# Patient Record
Sex: FEMALE | NOT HISPANIC OR LATINO | Employment: STUDENT
[De-identification: names, ages, dates, MRNs, and addresses within clinical notes are randomized per-mention and may not be internally consistent; named-entity substitution may affect disease eponyms.]

---
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
## 2023-02-02 RX ORDER — ETONOGESTREL/ETHINYL ESTRADIOL .12-.015MG
RING, VAGINAL VAGINAL
Qty: 3 EACH | Refills: 3 | OUTPATIENT
Start: 2023-02-02

## 2023-05-02 ENCOUNTER — NURSE TRIAGE (OUTPATIENT)
Dept: TELEHEALTH | Age: 18
End: 2023-05-02

## 2023-05-02 ENCOUNTER — TELEPHONE (OUTPATIENT)
Dept: TELEHEALTH | Age: 18
End: 2023-05-02

## 2023-05-05 ENCOUNTER — IMAGING SERVICES (OUTPATIENT)
Dept: GENERAL RADIOLOGY | Age: 18
End: 2023-05-05
Attending: FAMILY MEDICINE

## 2023-05-05 ENCOUNTER — OFFICE VISIT (OUTPATIENT)
Dept: FAMILY MEDICINE | Age: 18
End: 2023-05-05

## 2023-05-05 VITALS
OXYGEN SATURATION: 100 % | HEART RATE: 76 BPM | BODY MASS INDEX: 20.89 KG/M2 | WEIGHT: 130 LBS | HEIGHT: 66 IN | SYSTOLIC BLOOD PRESSURE: 99 MMHG | DIASTOLIC BLOOD PRESSURE: 66 MMHG | TEMPERATURE: 97.6 F

## 2023-05-05 DIAGNOSIS — R05.3 PERSISTENT COUGH: ICD-10-CM

## 2023-05-05 DIAGNOSIS — R05.3 PERSISTENT COUGH: Primary | ICD-10-CM

## 2023-05-05 DIAGNOSIS — J20.9 ACUTE BRONCHITIS, UNSPECIFIED ORGANISM: ICD-10-CM

## 2023-05-05 PROCEDURE — 99214 OFFICE O/P EST MOD 30 MIN: CPT | Performed by: FAMILY MEDICINE

## 2023-05-05 PROCEDURE — 71046 X-RAY EXAM CHEST 2 VIEWS: CPT | Performed by: RADIOLOGY

## 2023-05-05 RX ORDER — PREDNISONE 20 MG/1
40 TABLET ORAL DAILY
Qty: 10 TABLET | Refills: 0 | Status: SHIPPED | OUTPATIENT
Start: 2023-05-05 | End: 2023-07-11 | Stop reason: ALTCHOICE

## 2023-05-05 RX ORDER — ALBUTEROL SULFATE 90 UG/1
1-2 AEROSOL, METERED RESPIRATORY (INHALATION)
COMMUNITY
Start: 2023-04-20 | End: 2023-07-11 | Stop reason: ALTCHOICE

## 2023-05-05 RX ORDER — AZITHROMYCIN 250 MG/1
TABLET, FILM COATED ORAL
Qty: 6 TABLET | Refills: 0 | Status: SHIPPED | OUTPATIENT
Start: 2023-05-05 | End: 2023-07-11 | Stop reason: ALTCHOICE

## 2023-05-05 ASSESSMENT — ENCOUNTER SYMPTOMS
FATIGUE: 0
COUGH: 1
SORE THROAT: 0
DIARRHEA: 0
ABDOMINAL PAIN: 0
CONSTIPATION: 0
SHORTNESS OF BREATH: 0
TROUBLE SWALLOWING: 0

## 2023-07-06 ENCOUNTER — NURSE TRIAGE (OUTPATIENT)
Dept: FAMILY MEDICINE | Age: 18
End: 2023-07-06

## 2023-07-11 ENCOUNTER — OFFICE VISIT (OUTPATIENT)
Dept: FAMILY MEDICINE | Age: 18
End: 2023-07-11

## 2023-07-11 ENCOUNTER — TELEPHONE (OUTPATIENT)
Dept: SURGERY | Age: 18
End: 2023-07-11

## 2023-07-11 VITALS
HEART RATE: 77 BPM | DIASTOLIC BLOOD PRESSURE: 74 MMHG | SYSTOLIC BLOOD PRESSURE: 115 MMHG | WEIGHT: 130.14 LBS | BODY MASS INDEX: 21.68 KG/M2 | OXYGEN SATURATION: 99 % | TEMPERATURE: 98.4 F | HEIGHT: 65 IN

## 2023-07-11 DIAGNOSIS — F41.1 GENERALIZED ANXIETY DISORDER: Primary | ICD-10-CM

## 2023-07-11 DIAGNOSIS — N60.82 CYST OF SKIN OF LEFT BREAST: ICD-10-CM

## 2023-07-11 PROBLEM — J20.9 ACUTE BRONCHITIS: Status: RESOLVED | Noted: 2023-05-05 | Resolved: 2023-07-11

## 2023-07-11 PROBLEM — R05.3 PERSISTENT COUGH: Status: RESOLVED | Noted: 2023-05-05 | Resolved: 2023-07-11

## 2023-07-11 PROCEDURE — 99214 OFFICE O/P EST MOD 30 MIN: CPT | Performed by: FAMILY MEDICINE

## 2023-07-11 RX ORDER — DESVENLAFAXINE 25 MG/1
25 TABLET, EXTENDED RELEASE ORAL DAILY
Qty: 30 TABLET | Refills: 3 | Status: SHIPPED | OUTPATIENT
Start: 2023-07-11

## 2023-07-11 ASSESSMENT — ENCOUNTER SYMPTOMS
FATIGUE: 0
SHORTNESS OF BREATH: 0
NERVOUS/ANXIOUS: 1
SLEEP DISTURBANCE: 0
COUGH: 0

## 2023-07-11 ASSESSMENT — PATIENT HEALTH QUESTIONNAIRE - PHQ9
CLINICAL INTERPRETATION OF PHQ2 SCORE: NO FURTHER SCREENING NEEDED
SUM OF ALL RESPONSES TO PHQ9 QUESTIONS 1 AND 2: 0
SUM OF ALL RESPONSES TO PHQ9 QUESTIONS 1 AND 2: 0
2. FEELING DOWN, DEPRESSED OR HOPELESS: NOT AT ALL
1. LITTLE INTEREST OR PLEASURE IN DOING THINGS: NOT AT ALL

## 2023-08-07 ENCOUNTER — OFFICE VISIT (OUTPATIENT)
Dept: SURGERY | Age: 18
End: 2023-08-07

## 2023-08-07 VITALS — BODY MASS INDEX: 21.33 KG/M2 | WEIGHT: 128 LBS | HEIGHT: 65 IN

## 2023-08-07 DIAGNOSIS — N63.23 MASS OF LOWER OUTER QUADRANT OF LEFT BREAST: Primary | ICD-10-CM

## 2023-08-07 PROCEDURE — 99242 OFF/OP CONSLTJ NEW/EST SF 20: CPT | Performed by: NURSE PRACTITIONER

## 2023-08-07 ASSESSMENT — PAIN SCALES - GENERAL: PAINLEVEL: 0

## 2023-10-30 ENCOUNTER — TELEPHONE (OUTPATIENT)
Dept: SURGERY | Age: 18
End: 2023-10-30

## 2023-11-29 ENCOUNTER — APPOINTMENT (OUTPATIENT)
Dept: SURGERY | Age: 18
End: 2023-11-29

## 2024-01-10 DIAGNOSIS — F41.1 GENERALIZED ANXIETY DISORDER: ICD-10-CM

## 2024-01-10 RX ORDER — DESVENLAFAXINE 25 MG/1
25 TABLET, EXTENDED RELEASE ORAL DAILY
Qty: 90 TABLET | Refills: 0 | Status: SHIPPED | OUTPATIENT
Start: 2024-01-10

## 2024-05-21 DIAGNOSIS — F41.1 GENERALIZED ANXIETY DISORDER: ICD-10-CM

## 2024-05-21 RX ORDER — DESVENLAFAXINE 25 MG/1
TABLET, EXTENDED RELEASE ORAL
Qty: 90 TABLET | Refills: 0 | Status: SHIPPED | OUTPATIENT
Start: 2024-05-21

## 2024-06-11 ENCOUNTER — LAB SERVICES (OUTPATIENT)
Dept: LAB | Age: 19
End: 2024-06-11

## 2024-06-11 ENCOUNTER — APPOINTMENT (OUTPATIENT)
Dept: FAMILY MEDICINE | Age: 19
End: 2024-06-11

## 2024-06-11 VITALS
HEIGHT: 65 IN | HEART RATE: 96 BPM | DIASTOLIC BLOOD PRESSURE: 78 MMHG | SYSTOLIC BLOOD PRESSURE: 118 MMHG | BODY MASS INDEX: 20.92 KG/M2 | WEIGHT: 125.55 LBS | TEMPERATURE: 97.9 F

## 2024-06-11 DIAGNOSIS — Z00.00 ANNUAL PHYSICAL EXAM: Primary | ICD-10-CM

## 2024-06-11 DIAGNOSIS — Z00.00 ANNUAL PHYSICAL EXAM: ICD-10-CM

## 2024-06-11 PROBLEM — N60.82 CYST OF SKIN OF LEFT BREAST: Status: RESOLVED | Noted: 2023-07-11 | Resolved: 2024-06-11

## 2024-06-11 LAB
ALBUMIN SERPL-MCNC: 3.6 G/DL (ref 3.6–5.1)
ALBUMIN/GLOB SERPL: 1 {RATIO} (ref 1–2.4)
ALP SERPL-CCNC: 44 UNITS/L (ref 45–117)
ALT SERPL-CCNC: 19 UNITS/L
ANION GAP SERPL CALC-SCNC: 12 MMOL/L (ref 7–19)
AST SERPL-CCNC: 13 UNITS/L
BASOPHILS # BLD: 0 K/MCL (ref 0–0.3)
BASOPHILS NFR BLD: 0 %
BILIRUB SERPL-MCNC: 0.3 MG/DL (ref 0.2–1)
BUN SERPL-MCNC: 12 MG/DL (ref 6–20)
BUN/CREAT SERPL: 16 (ref 7–25)
CALCIUM SERPL-MCNC: 9.1 MG/DL (ref 8.4–10.2)
CHLORIDE SERPL-SCNC: 103 MMOL/L (ref 97–110)
CHOLEST SERPL-MCNC: 198 MG/DL
CHOLEST/HDLC SERPL: 2 {RATIO}
CO2 SERPL-SCNC: 30 MMOL/L (ref 21–32)
CREAT SERPL-MCNC: 0.74 MG/DL (ref 0.51–0.95)
DEPRECATED RDW RBC: 41.6 FL (ref 39–50)
EGFRCR SERPLBLD CKD-EPI 2021: >90 ML/MIN/{1.73_M2}
EOSINOPHIL # BLD: 0.1 K/MCL (ref 0–0.5)
EOSINOPHIL NFR BLD: 1 %
ERYTHROCYTE [DISTWIDTH] IN BLOOD: 13 % (ref 11–15)
FASTING DURATION TIME PATIENT: 12 HOURS (ref 0–999)
GLOBULIN SER-MCNC: 3.5 G/DL (ref 2–4)
GLUCOSE SERPL-MCNC: 82 MG/DL (ref 70–99)
HCT VFR BLD CALC: 34.6 % (ref 36–46.5)
HDLC SERPL-MCNC: 97 MG/DL
HGB BLD-MCNC: 11.8 G/DL (ref 12–15.5)
IMM GRANULOCYTES # BLD AUTO: 0 K/MCL (ref 0–0.2)
IMM GRANULOCYTES # BLD: 0 %
LDLC SERPL CALC-MCNC: 88 MG/DL
LYMPHOCYTES # BLD: 3.5 K/MCL (ref 1.2–5.2)
LYMPHOCYTES NFR BLD: 51 %
MCH RBC QN AUTO: 29.9 PG (ref 26–34)
MCHC RBC AUTO-ENTMCNC: 34.1 G/DL (ref 32–36.5)
MCV RBC AUTO: 87.6 FL (ref 78–100)
MONOCYTES # BLD: 0.6 K/MCL (ref 0.3–0.9)
MONOCYTES NFR BLD: 9 %
NEUTROPHILS # BLD: 2.7 K/MCL (ref 1.8–8)
NEUTROPHILS NFR BLD: 39 %
NONHDLC SERPL-MCNC: 101 MG/DL
NRBC BLD MANUAL-RTO: 0 /100 WBC
PLATELET # BLD AUTO: 201 K/MCL (ref 140–450)
POTASSIUM SERPL-SCNC: 3.8 MMOL/L (ref 3.4–5.1)
PROT SERPL-MCNC: 7.1 G/DL (ref 6.4–8.2)
RBC # BLD: 3.95 MIL/MCL (ref 4–5.2)
SODIUM SERPL-SCNC: 141 MMOL/L (ref 135–145)
TRIGL SERPL-MCNC: 67 MG/DL
TSH SERPL-ACNC: 3.29 MCUNITS/ML (ref 0.46–4.13)
WBC # BLD: 6.9 K/MCL (ref 4.2–11)

## 2024-06-11 PROCEDURE — 80061 LIPID PANEL: CPT | Performed by: INTERNAL MEDICINE

## 2024-06-11 PROCEDURE — 99395 PREV VISIT EST AGE 18-39: CPT | Performed by: FAMILY MEDICINE

## 2024-06-11 PROCEDURE — 80050 GENERAL HEALTH PANEL: CPT | Performed by: INTERNAL MEDICINE

## 2024-06-11 PROCEDURE — 36415 COLL VENOUS BLD VENIPUNCTURE: CPT | Performed by: FAMILY MEDICINE

## 2024-06-11 RX ORDER — ALBUTEROL SULFATE 90 UG/1
AEROSOL, METERED RESPIRATORY (INHALATION)
COMMUNITY
Start: 2024-06-05

## 2024-06-11 RX ORDER — BENZONATATE 200 MG/1
CAPSULE ORAL
COMMUNITY
Start: 2024-06-05

## 2024-06-11 ASSESSMENT — ENCOUNTER SYMPTOMS
DIARRHEA: 0
BRUISES/BLEEDS EASILY: 0
EYE PAIN: 0
DIZZINESS: 0
ABDOMINAL PAIN: 0
FATIGUE: 0
COUGH: 0
CONSTIPATION: 0
SORE THROAT: 0
HEADACHES: 0
TROUBLE SWALLOWING: 0
SHORTNESS OF BREATH: 0

## 2024-06-11 ASSESSMENT — PATIENT HEALTH QUESTIONNAIRE - PHQ9
SUM OF ALL RESPONSES TO PHQ9 QUESTIONS 1 AND 2: 0
1. LITTLE INTEREST OR PLEASURE IN DOING THINGS: NOT AT ALL
SUM OF ALL RESPONSES TO PHQ9 QUESTIONS 1 AND 2: 0
CLINICAL INTERPRETATION OF PHQ2 SCORE: NO FURTHER SCREENING NEEDED
2. FEELING DOWN, DEPRESSED OR HOPELESS: NOT AT ALL

## 2024-12-27 ENCOUNTER — TELEPHONE (OUTPATIENT)
Dept: GASTROENTEROLOGY | Age: 19
End: 2024-12-27

## 2025-01-13 DIAGNOSIS — F41.1 GENERALIZED ANXIETY DISORDER: ICD-10-CM

## 2025-01-13 RX ORDER — DESVENLAFAXINE 25 MG/1
TABLET, EXTENDED RELEASE ORAL
Qty: 90 TABLET | Refills: 0 | Status: SHIPPED | OUTPATIENT
Start: 2025-01-13

## 2025-06-28 DIAGNOSIS — F41.1 GENERALIZED ANXIETY DISORDER: ICD-10-CM

## 2025-07-01 ENCOUNTER — E-ADVICE (OUTPATIENT)
Dept: FAMILY MEDICINE | Age: 20
End: 2025-07-01

## 2025-07-01 DIAGNOSIS — F41.1 GENERALIZED ANXIETY DISORDER: ICD-10-CM

## 2025-07-01 RX ORDER — DESVENLAFAXINE 25 MG/1
25 TABLET, EXTENDED RELEASE ORAL DAILY
Qty: 90 TABLET | Refills: 1 | Status: SHIPPED | OUTPATIENT
Start: 2025-07-01 | End: 2025-07-02 | Stop reason: CLARIF

## 2025-07-02 ENCOUNTER — TELEPHONE (OUTPATIENT)
Dept: FAMILY MEDICINE | Age: 20
End: 2025-07-02

## 2025-07-02 DIAGNOSIS — F41.1 GENERALIZED ANXIETY DISORDER: Primary | ICD-10-CM

## 2025-07-02 RX ORDER — DESVENLAFAXINE 25 MG/1
25 TABLET, EXTENDED RELEASE ORAL DAILY
Qty: 90 TABLET | Refills: 1 | Status: SHIPPED | OUTPATIENT
Start: 2025-07-02

## 2025-07-03 RX ORDER — DESVENLAFAXINE 25 MG/1
TABLET, EXTENDED RELEASE ORAL
Qty: 90 TABLET | Refills: 0 | OUTPATIENT
Start: 2025-07-03

## (undated) NOTE — ED AVS SNAPSHOT
Parent/Legal Guardian Access to the Online [place] Record of a Patient 15to 16Years Old  Return completed form by Secure email to Emma MCCURDY/Medical Records Department: [email or web address]     Requirements and Procedures   Under [facility] ·  I understand that [employer] St[employer] is intended as a secure online source of confidential medical information.  If I share my [employer] ID and password with another person, that person may be able to view my or my child’s health information, and health information a · This form does not substitute as an Authorization to Release health information to a designated proxy by any other method. The purpose of this Minor Proxy form is for access to the [employer] portal information.     By signing below, I acknowledge that I ha I have read and understand the requirements and procedures for accessing my child’s medical record information online as provided  on page one of this document titled, Parent/Legal Guardian Access to the Online MyChart Record of a Patient [address]